# Patient Record
Sex: FEMALE | Race: OTHER | Employment: STUDENT | URBAN - METROPOLITAN AREA
[De-identification: names, ages, dates, MRNs, and addresses within clinical notes are randomized per-mention and may not be internally consistent; named-entity substitution may affect disease eponyms.]

---
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
## 2024-10-09 ENCOUNTER — APPOINTMENT (OUTPATIENT)
Dept: GENERAL RADIOLOGY | Facility: HOSPITAL | Age: 24
End: 2024-10-09
Attending: NURSE PRACTITIONER
Payer: MEDICAID

## 2024-10-09 ENCOUNTER — HOSPITAL ENCOUNTER (EMERGENCY)
Facility: HOSPITAL | Age: 24
Discharge: HOME | End: 2024-10-09
Payer: MEDICAID

## 2024-10-09 VITALS
BODY MASS INDEX: 23.74 KG/M2 | OXYGEN SATURATION: 99 % | HEART RATE: 97 BPM | SYSTOLIC BLOOD PRESSURE: 110 MMHG | WEIGHT: 117.75 LBS | RESPIRATION RATE: 18 BRPM | DIASTOLIC BLOOD PRESSURE: 62 MMHG | TEMPERATURE: 99 F | HEIGHT: 59 IN

## 2024-10-09 DIAGNOSIS — R07.81 RIB PAIN ON RIGHT SIDE: Primary | ICD-10-CM

## 2024-10-09 PROCEDURE — 99283 EMERGENCY DEPT VISIT LOW MDM: CPT

## 2024-10-09 PROCEDURE — 71101 X-RAY EXAM UNILAT RIBS/CHEST: CPT | Performed by: NURSE PRACTITIONER

## 2024-10-09 PROCEDURE — 99284 EMERGENCY DEPT VISIT MOD MDM: CPT

## 2024-10-09 RX ORDER — TRAMADOL HYDROCHLORIDE 50 MG/1
TABLET ORAL EVERY 6 HOURS PRN
Qty: 10 TABLET | Refills: 0 | Status: SHIPPED | OUTPATIENT
Start: 2024-10-09 | End: 2024-10-14

## 2024-10-10 NOTE — ED PROVIDER NOTES
Patient Seen in: [facility] Emergency Department      History     Chief Complaint   Patient presents with    Flank Pain     Stated Complaint:     Subjective:   25yo/f w no chronic medical problems reports with right rib pain. 2 weeks ago she fell while in [facility] and landed on a board. She was seen locally and had an xray with a rib fracture. Reports she was healing well until yesterday when she leaned over to  her purse and felt sudden sharp, recurrence of pain. No dyspnea. No hemoptysis. Only with palpation, movement, deep inspiration.               Objective:     Past Medical History:    H/O  section              History reviewed. No pertinent surgical history.             Social History     Socioeconomic History    Marital status: Single   Tobacco Use    Smoking status: Never    Smokeless tobacco: Never   Substance and Sexual Activity    Alcohol use: Never    Drug use: Never     Social Drivers of Health      Received from [facility]    Social Connections    Received from [facility]    Housing Stability                  Physical Exam     ED Triage Vitals [10/09/24 1951]   /78   Pulse 100   Resp 19   Temp 99 °F (37.2 °C)   Temp src Temporal   SpO2 98 %   O2 Device None (Room air)       Current Vitals:   Vital Signs  BP: 127/72  Pulse: 99  Resp: 20  Temp: 99 °F (37.2 °C)  Temp src: Temporal    Oxygen Therapy  SpO2: 99 %  O2 Device: None (Room air)        Physical Exam  Vitals and nursing note reviewed.   Constitutional:       General: She is not in acute distress.     Appearance: She is well-developed.   HENT:      Head: Normocephalic and atraumatic.      Nose: Nose normal.      Mouth/Throat:      Mouth: Mucous membranes are moist.   Eyes:      Conjunctiva/sclera: Conjunctivae normal.      Pupils: Pupils are equal, round, and reactive to light.   Cardiovascular:      Rate and Rhythm: Normal rate and regular rhythm.      Heart sounds: Normal  heart sounds.   Pulmonary:      Effort: Pulmonary effort is normal.      Breath sounds: Normal breath sounds.   Abdominal:      General: Bowel sounds are normal.      Palpations: Abdomen is soft.   Musculoskeletal:         General: Tenderness present. No deformity. Normal range of motion.      Cervical back: Normal range of motion and neck supple.      Comments: TTP right lateral ribs, no crepitus, lungs ctab   Skin:     General: Skin is warm and dry.      Capillary Refill: Capillary refill takes less than 2 seconds.      Findings: No rash.      Comments: Normal color   Neurological:      General: No focal deficit present.      Mental Status: She is alert and oriented to person, place, and time.      GCS: GCS eye subscore is 4. GCS verbal subscore is 5. GCS motor subscore is 6.      Cranial Nerves: No cranial nerve deficit.      Gait: Gait normal.             ED Course   Labs Reviewed - No data to display     Impression  CONCLUSION:     Within the limits of this exam, no acute displaced fracture is seen.     No radiographic evidence of acute cardiopulmonary abnormality.           [facility]           Dictated by (CST): Jf Rodriguez MD on 10/09/2024 at 8:56 PM      Finalized by (CST): Jf Rodriguez MD on 10/09/2024 at 8:58 PM                [facility]              Medical Decision Making  23yo/f w hx and exam as stated; rib injury    Xray non acute  Lungs ctab  100% on ra  No vomiting  No dyspnea  Perc neg    Plan  Dc to home  Supportive care      Amount and/or Complexity of Data Reviewed  Radiology:  Decision-making details documented in ED Course.    Risk  OTC drugs.  Prescription drug management.        Disposition and Plan     Clinical Impression:  1. Rib pain on right side         Disposition:  Discharge  10/9/2024  9:02 pm    Follow-up:  Madhav Barron MD  [facility] [address] [address]  [phone]    Follow up in 2 day(s)            Medications Prescribed:  Current Discharge Medication List         START taking these medications    Details   traMADol 50 MG Oral Tab Take 1-2 tablets ( mg total) by mouth every 6 (six) hours as needed for Pain.  Qty: 10 tablet, Refills: 0    Associated Diagnoses: Rib pain on right side                 Supplementary Documentation:

## 2024-10-10 NOTE — ED INITIAL ASSESSMENT (HPI)
Patient ambulatory to ED c/o pain to R flank/back pain. Patient broke a rib on the R side from a fall 2 weeks ago. Patient states she picked up her purse yesterday and has felt more pain on the R side since then

## (undated) DEVICE — ABSORBABLE HEMOSTAT (OXIDIZED REGENERATED CELLULOSE, U.S.P.): Brand: SURGICEL

## (undated) NOTE — LETTER
[address]      Authorization for Surgical Operation and Procedure     Date:_10/14/2020__________                                                                                                         Ti 4.   Should the need arise during my operation or immediate post-operative period, I also consent to the administration of blood and/or blood products.   Further, I understand that despite careful testing and screening of blood or blood products by shannon 8.   I recognize that in the event my procedure results in extended X-Ray/fluoroscopy time, I may develop a skin reaction. 9.  If I have a Do Not Attempt Resuscitation (DNAR) order in place, that status will be suspended while in the operating room, proc STATEMENT OF PHYSICIAN My signature below affirms that prior to the time of the procedure; I have explained to the patient and/or his/her legal representative, the risks and benefits involved in the proposed treatment and any reasonable alternative to the

## (undated) NOTE — LETTER
[facility]  Administration of Anesthesia  1. I, Chen Foote, or _________________________________ acting on her behalf, (Patient) (Dependent/Representative) request to receive anesthesia for my pending procedure/operation/treatment.   ELSA woods 6. OBSTETRIC PATIENTS: Specific risks/consequences of spinal/epidural anesthesia may include itching, low blood pressure, difficulty urinating, slowing of the baby's heart rate and headache.  Rare risks include infections, high spinal block, spinal bleeding ___________________________________________________           _____________________________________________________  Date/Time                                                                                               Responsible person in case of minor